# Patient Record
Sex: FEMALE | Race: WHITE | NOT HISPANIC OR LATINO | Employment: UNEMPLOYED | URBAN - METROPOLITAN AREA
[De-identification: names, ages, dates, MRNs, and addresses within clinical notes are randomized per-mention and may not be internally consistent; named-entity substitution may affect disease eponyms.]

---

## 2018-05-09 ENCOUNTER — APPOINTMENT (OUTPATIENT)
Dept: PHYSICAL MEDICINE AND REHAB | Facility: MEDICAL CENTER | Age: 58
End: 2018-05-09
Payer: MEDICAID

## 2018-05-11 ENCOUNTER — HOSPITAL ENCOUNTER (OUTPATIENT)
Dept: RADIOLOGY | Facility: MEDICAL CENTER | Age: 58
End: 2018-05-11

## 2018-05-11 ENCOUNTER — OFFICE VISIT (OUTPATIENT)
Dept: PHYSICAL MEDICINE AND REHAB | Facility: MEDICAL CENTER | Age: 58
End: 2018-05-11
Payer: MEDICAID

## 2018-05-11 VITALS
WEIGHT: 191 LBS | DIASTOLIC BLOOD PRESSURE: 70 MMHG | SYSTOLIC BLOOD PRESSURE: 122 MMHG | HEART RATE: 83 BPM | BODY MASS INDEX: 31.82 KG/M2 | OXYGEN SATURATION: 95 % | HEIGHT: 65 IN | TEMPERATURE: 97.8 F

## 2018-05-11 DIAGNOSIS — M48.02 FORAMINAL STENOSIS OF CERVICAL REGION: ICD-10-CM

## 2018-05-11 DIAGNOSIS — M47.816 LUMBAR SPONDYLOSIS: ICD-10-CM

## 2018-05-11 DIAGNOSIS — M48.02 CERVICAL STENOSIS OF SPINAL CANAL: ICD-10-CM

## 2018-05-11 DIAGNOSIS — M43.12 SPONDYLOLISTHESIS, CERVICAL REGION: ICD-10-CM

## 2018-05-11 DIAGNOSIS — R20.2 PARESTHESIAS: ICD-10-CM

## 2018-05-11 PROCEDURE — 99205 OFFICE O/P NEW HI 60 MIN: CPT | Performed by: PHYSICAL MEDICINE & REHABILITATION

## 2018-05-11 RX ORDER — DIPHENHYDRAMINE HCL 25 MG
25 TABLET ORAL EVERY 6 HOURS PRN
COMMUNITY
End: 2018-08-22

## 2018-05-11 RX ORDER — FLUOXETINE HYDROCHLORIDE 40 MG/1
40 CAPSULE ORAL DAILY
COMMUNITY

## 2018-05-11 RX ORDER — CLONAZEPAM 1 MG/1
0.5 TABLET ORAL 3 TIMES DAILY
COMMUNITY
End: 2018-07-27

## 2018-05-11 RX ORDER — METHOCARBAMOL 500 MG/1
1000 TABLET, FILM COATED ORAL 4 TIMES DAILY
COMMUNITY

## 2018-05-11 RX ORDER — TRAZODONE HYDROCHLORIDE 50 MG/1
50 TABLET ORAL 3 TIMES DAILY
COMMUNITY

## 2018-05-11 RX ORDER — LISINOPRIL 10 MG/1
10 TABLET ORAL DAILY
COMMUNITY
End: 2018-07-27

## 2018-05-11 RX ORDER — IPRATROPIUM BROMIDE AND ALBUTEROL SULFATE 2.5; .5 MG/3ML; MG/3ML
3 SOLUTION RESPIRATORY (INHALATION) 4 TIMES DAILY
COMMUNITY

## 2018-05-11 RX ORDER — IBUPROFEN 600 MG/1
600 TABLET ORAL EVERY 6 HOURS PRN
COMMUNITY

## 2018-05-11 RX ORDER — OLANZAPINE 5 MG/1
5 TABLET ORAL NIGHTLY
COMMUNITY
End: 2018-08-22

## 2018-05-11 RX ORDER — GABAPENTIN 300 MG/1
300 CAPSULE ORAL 2 TIMES DAILY
COMMUNITY
End: 2018-07-03 | Stop reason: SDUPTHER

## 2018-05-11 RX ORDER — MONTELUKAST SODIUM 10 MG/1
10 TABLET ORAL DAILY
COMMUNITY

## 2018-05-11 RX ORDER — SIMVASTATIN 40 MG
40 TABLET ORAL NIGHTLY
COMMUNITY

## 2018-05-11 RX ORDER — FLUTICASONE PROPIONATE 50 MCG
1 SPRAY, SUSPENSION (ML) NASAL DAILY
COMMUNITY

## 2018-05-11 ASSESSMENT — ENCOUNTER SYMPTOMS
BLURRED VISION: 1
SHORTNESS OF BREATH: 1
WEIGHT LOSS: 1
CHILLS: 1
BACK PAIN: 1
SINUS PAIN: 1
DEPRESSION: 1
NECK PAIN: 1
HALLUCINATIONS: 1
CARDIOVASCULAR NEGATIVE: 1
ROS GI COMMENTS: ULCERATIVE COLITIS
DIZZINESS: 1

## 2018-05-11 ASSESSMENT — PAIN SCALES - GENERAL: PAINLEVEL: 8=MODERATE-SEVERE PAIN

## 2018-05-11 NOTE — PROGRESS NOTES
New patient note    Physiatry (physical medicine and  Rehabilitation), interventional spine and sports medicine, Pain medicine    Date of Service: 5/11/2018    Chief complaint:   Multiple joint pains    HISTORY    HPI: Dang Urbina 57 y.o. female who presents today with complaints of multiple pain locations.  The back pain seems to be the worst to her.  The pain has been present her whole life.  At age 28, she was started on tylenol #3 and has been on pain medications on and off since that time.  In the past, she was started on methadone, but felt that this was not for her.  No opioids since 2015.   She takes ibuprofen for pain, but not regularly.    She has numbness in the left hand and feels like the whole hand from the wrist down.  She has started using her right hand for more things because of the weakness and decreased dexterity in the left hand.  Occasionally, it wakes her up at night.    Her low back pain is primarily aching pain.  No numbness or tingling in the legs.  She is scheduled to do physical therapy next Tuesday and then hopefully, her insurance will approve this.  Walking helps with her back, she has been walking up to a mile a day.  Overall, she has been less active and has gained about 20 lbs. In the last three months.     ORT 16   reviewed.  Patient takes klonipin 1mg po tid  Patient uses cannabis about three times a month    Medical records review:  I reviewed the note from the referring provider Riri Hamm P.A. Dated 04/15/2018.  Additionally, reviewing note from 03/13/2018: She was seen in [place] by Norma Gallego on 05/12/2016, but then moved to [place]. She was apparently getting norco 10/325 #120 and soma 350mg #90 while in [place].  From review of the note on 03/13/2018, the patient wanted to restart klonipin rather than restrat pain medications.  She has been seeing behavrioral health through teleedicine at that time (she is now transitioning to have appointments at the  clinic).  From the report there, she drinks alcohol about 4-6 times a month and uses cannibis to control pain.  MRIs were ordered of the cervical and lumbar spine as well as the left shoulder at that time.  They increased her gabapentin dose and were using nicotine patches to see if she could quit smoking.    Previous treatments:    Physical Therapy: Yes     Medications the patient is tried: NSAIDs      ROS:   Red Flags ROS:   Fever, Chills, Sweats: Denies  Involuntary Weight Loss: Denies  Bladder Incontinence: Denies, sometimes cannot tell when she has to go, for last 4 years  Bowel Incontinence: Denies, sometimes cannot tell when she has to go, for last 4 years  Saddle Anesthesia: Reports that this is intermittent    Review of Systems   Constitutional: Positive for chills and weight loss.        Wt loss age 20   HENT: Positive for sinus pain.    Eyes: Positive for blurred vision.   Respiratory: Positive for shortness of breath.    Cardiovascular: Negative.    Gastrointestinal:        Ulcerative colitis   Genitourinary: Positive for urgency.   Musculoskeletal: Positive for back pain, joint pain and neck pain.   Skin: Positive for rash.   Neurological: Positive for dizziness.   Endo/Heme/Allergies: Negative.    Psychiatric/Behavioral: Positive for depression and hallucinations.       PMHx:  Past Medical History:   Diagnosis Date   • Allergy    • Anxiety    • Arthritis    • Asthma    • COPD (chronic obstructive pulmonary disease) ([facility])    • Depression    • Diabetes ([facility])    • Hypertension    • Migraine        PSHx:    Past Surgical History:   Procedure Laterality Date   • TUBAL COAGULATION LAPAROSCOPIC BILATERAL         Family history   Family History   Problem Relation Age of Onset   • Lung Disease Mother    • Hypertension Mother    • Arthritis Mother        Medications:    Current Outpatient Prescriptions   Medication   • methocarbamol (ROBAXIN) 500 MG Tab   • gabapentin (NEURONTIN) 300 MG Cap   • OLANZapine  "(ZYPREXA) 5 MG Tab   • montelukast (SINGULAIR) 10 MG Tab   • clonazePAM (KLONOPIN) 1 MG Tab   • simvastatin (ZOCOR) 40 MG Tab   • lisinopril (PRINIVIL) 10 MG Tab   • traZODone (DESYREL) 50 MG Tab   • metFORMIN (GLUCOPHAGE) 500 MG Tab   • fluoxetine (PROZAC) 40 MG capsule   • fluticasone (FLONASE) 50 MCG/ACT nasal spray   • fluticasone-salmeterol (ADVAIR DISKUS) 100-50 MCG/DOSE AEROSOL POWDER, BREATH ACTIVATED   • ipratropium-albuterol (DUONEB) 0.5-2.5 (3) MG/3ML nebulizer solution   • ibuprofen (MOTRIN) 600 MG Tab   • Albuterol Sulfate (PROVENTIL HFA INH)   • diphenhydrAMINE (BENADRYL) 25 MG Tab     No current facility-administered medications for this visit.        Allergies:   Allergies   Allergen Reactions   • Tramadol Unspecified     Patient reports that this caused falls       Social Hx:   Social History     Social History   • Marital status: Single     Spouse name: N/A   • Number of children: N/A   • Years of education: N/A     Occupational History   • disabled      2000     Social History Main Topics   • Smoking status: Current Every Day Smoker     Packs/day: 0.50     Years: 40.00     Types: Cigarettes   • Smokeless tobacco: Never Used   • Alcohol use No   • Drug use: Yes     Frequency: 2.0 times per week     Types: Marijuana      Comment: 2 A MONTH   • Sexual activity: Not on file     Other Topics Concern   •  Service No   • Blood Transfusions No   • Caffeine Concern No   • Occupational Exposure No   • Hobby Hazards No   • Sleep Concern No   • Stress Concern Yes   • Weight Concern Yes   • Special Diet No   • Back Care No   • Exercise Yes   • Bike Helmet No   • Seat Belt Yes   • Self-Exams Yes     Social History Narrative   • No narrative on file         EXAMINATION     Physical Exam:   Vitals: Blood pressure 122/70, pulse 83, temperature 36.6 °C (97.8 °F), height 1.651 m (5' 5\"), weight 86.6 kg (191 lb), SpO2 95 %.    Constitutional:   Body Habitus: Body mass index is 31.78 kg/m².  Cooperation: Fully " cooperates with exam  Appearance: Well-groomed, well-nourished, not disheveled   Eyes: No scleral icterus, no proptosis  ENT -no obvious auditory deficits, no obvious tongue lesions, tongue midline, no facial droop   Skin -no rashes or lesions noted   Respiratory-  breathing comfortable on room air, no audible wheezing  Cardiovascular- capillary refills less than 2 seconds. No lower extremity edema is noted.   Gastrointestinal - no obvious abdominal masses, No tenderness to palpation in the abdomen  Psychiatric- alert and oriented ×3. Normal affect.   Gait - normal gait, no use of ambulatory device, nonantalgic.  Heel and toe walking is difficult    Musculoskeletal -   Cervical spine   Inspection: No deformities of the skin over the cervical spine. No rashes or lesions.  decreased A/P ROM in all directions, with  pain    Spurling’s sign: positive bilaterally for neck pain  No signs of muscular atrophy in bilateral upper extremities       Thoracic/Lumbar Spine/Sacral Spine/Hips   Inspection: No evidence of atrophy in bilateral lower extremities throughout   ROM: full  AROM with flexion, extension, lateral flexion, and rotation bilaterally, without pain     Palpation:   No tenderness to palpation in midline at T1-T12 levels. No tenderness to palpation in the left and right of the midline T1-L5, NEGATIVE for tenderness to palpation to the para-midline region in the lower lumbar levels.  palpation over SI joint: negative bilaterally      Lumbar spine Special tests  Neuro tension  Straight leg test negative bilaterally      HIP  ROM of the hips is symmetric and nonpainful in internal and externl rotation bilaterally    SI joint tests  Observation patient sits on one buttocks: Negative   ALFREDO test negative bilaterally     Neuro     Key points for the international standards for neurological classification of spinal cord injury (ISNCSCI) to light touch.     Dermatome R L   C4 2  2   C5 2 2   C6 2 2   C7 2 2   C8 2 2   T1  2 2   T2 2 2   L2 2 2   L3 2 2   L4 2 2   L5 2 2   S1 2 2   S2 2 2       Motor Exam Upper Extremities   ? Myotome R L   Shoulder flexion C5 5 4+   Elbow flexion C5 5 4+   Wrist extension C6 5 5-   Elbow extension C7 5 5-   Finger flexion C8 5 5   Finger abduction T1 5 5     Empty can test is positive on the left and negative on the right.  Positive Hawkin's test on the left and negative on the right.  Decreased AROM of the left shoulder.      Motor Exam Lower Extremities    ? Myotome R L   Hip flexion L2 5 5   Knee extension L3 5 5   Ankle dorsiflexion L4 5 5   Toe extension L5 5 5   Ankle plantarflexion S1 5 5       Roblero’s sign positive left, negative right   Babinski sign negative bilaterally   Clonus of the ankle: one to two beats on the left ankle, no clonus on the right    Reflexes  ?  R L   Biceps  2+  2+   Brachioradialis  2+ 2+   Patella  2+ 2+   Achilles   2+ 2+       MEDICAL DECISION MAKING    Medical records review: see under HPI section.     DATA    Labs:   No results found for: SODIUM, POTASSIUM, CHLORIDE, CO2, GLUCOSE, BUN, CREATININE, BUNCREATRAT, GLOMRATE     No results found for: PROTHROMBTM, INR     No results found for: WBC, RBC, HEMOGLOBIN, HEMATOCRIT, MCV, MCH, MCHC, MPV, NEUTSPOLYS, LYMPHOCYTES, MONOCYTES, EOSINOPHILS, BASOPHILS, HYPOCHROMIA, ANISOCYTOSIS       Imaging: I personally reviewed following images, these are my reads  Cervical spine MRI 04/19/2018: There are multiple levels of canal stenosis due to disc-osteophyte complexes, facet arthropathy and congenitally small canal.  C4-5: Effacement of the CSF around the cord with some cord flattening.  Foraminal stenosis is noted.  C5-6 effacement of the CSF around the cord.  Severe foraminal narrowing is noted at this level.  C6-7 bilateral foraminal stenosis.    IMAGING radiology reads. I reviewed the following radiology reads   Cervical spine: 04/19/2018: Impression: moderate multilevel degenerative cervical spondylosis.  This results  in mild-to-moderate multilevel acquired/congenital canal stenosis most severe at C4-5 through C6-7.  There is multilevel accompanying foraminal narrowing as outlined above.    Xrays cervical spine: 03/05/2018: Grade I anterolisthesis of C4 on C5 is noted of 5 mm.  Mild multilevel cervical spondylosis    MRI lumbar spine 04/19/2018: Impression:  Large diffuse disc bulge with annular fissure and moderate facet and ligamentum flavum hypertrophy at L4-5 combine to cause mild to moderate narrowing of the central canal to 8-9 mm AP and impingement of the lateral recesses.  Mild bilateral foraminal narrowing as well.    2. Mild diffuse disc bulging, facet and ligamentum flavum hypertrophy at L3-4 and L5-S1 without central stenosis.  Mild impingement of the lateral recesses at L3-4 and mild foraminal narrowing at both levels.             Left shoulder: 04/19/2018:Impression: Small full-thickness tear posterior supraspinatus footplate measuring 1cm in the AP with minimal medial retraction of the torn tendon fibers.  No muscle atrophy.,  2. Small glenohumeral joint effusion                                                                                               Diagnosis   Diagnoses of Cervical stenosis of spinal canal, Spondylolisthesis, cervical region, Foraminal stenosis of cervical region, Lumbar spondylosis, and Paresthesias were pertinent to this visit.      ASSESSMENT:  Dang Shaikhwindy 57 y.o. female who has      Dang was seen today for new patient.    Diagnoses and all orders for this visit:    Cervical stenosis of spinal canal  -     REFERRAL TO EMG - PHYSIATRY (PMR)    Spondylolisthesis, cervical region  -     DX-CERVICAL SPINE-FLX-EXT ONLY; Future  -     REFERRAL TO EMG - PHYSIATRY (PMR)    Foraminal stenosis of cervical region  -     REFERRAL TO EMG - PHYSIATRY (PMR)    Lumbar spondylosis    Paresthesias  -     REFERRAL TO EMG - PHYSIATRY (PMR)    Dang was referred for musculoskeletal pain complaints.  I have  requested cervical spine films to assess for instability in flexion/extension given the report of 5mm of C4-5 anterolisthesis.  This is not so apparent on MRI cervical spine, but should be assessed.      While she has a partial full-thickness rotator cuff tear, I am concerned that some of her left upper extremity symptoms could relate to the spinal and foraminal stenosis that she has in her cervical spine.  I have ordered an EMG.    Once the EMG and cervical spine films are reviewed, we can determine best treatment.  If she is unstable, clearly Neurosurgical referral is indicated, particularly considering the cord flattening already noted.  It may be that surgical referral is determined anyway, we can decide this after the imaging.    If no instability, a trial of injections and/or physical therapy could also be considered with surgical consultation still considered.    Regarding her use of pain medications.  Just a few months ago, she felt that being back on the klonipin was most helpful for her.  Now, she is feeling like the pain medications are more important.  We discussed that at this clinic, I will not write opioids if she is using benzodiazepines or marijuana.  These are both things that she uses and I would like her to consult with behavioral health.  Additionally, she is a high risk for use of opioids with an ORT of 16. Use of opioids would not be recommended at this time.    Thank you very much for asking me to participate in Dang Urbina's care.  Please contact me with any questions or concerns.    Follow-up:for EMG or after imaging      Please note that this dictation was created using voice recognition software. I have made every reasonable attempt to correct obvious errors but there may be errors of grammar and content that I may have overlooked prior to finalization of this note.      Christopher Olson MD  Physical Medicine and Rehabilitation  Interventional Spine and Sports Physiatry  [facility]        Riri Chase P.A.

## 2018-05-15 ENCOUNTER — TELEPHONE (OUTPATIENT)
Dept: PHYSICAL MEDICINE AND REHAB | Facility: MEDICAL CENTER | Age: 58
End: 2018-05-15

## 2018-05-15 NOTE — TELEPHONE ENCOUNTER
I spoke with Dang and let her know she has c spine Xray to do, that may have been put in after last visit.     She said she will have done. She wants me to fax order to [facility].

## 2018-05-15 NOTE — TELEPHONE ENCOUNTER
I left message to call me back. ( wanted to let her know she has an Xray order in chart to have done.)

## 2018-05-17 ENCOUNTER — HOSPITAL ENCOUNTER (OUTPATIENT)
Dept: RADIOLOGY | Facility: MEDICAL CENTER | Age: 58
End: 2018-05-17

## 2018-06-18 ENCOUNTER — OFFICE VISIT (OUTPATIENT)
Dept: BEHAVIORAL HEALTH | Facility: PHYSICIAN GROUP | Age: 58
End: 2018-06-18
Payer: MEDICAID

## 2018-06-18 VITALS
SYSTOLIC BLOOD PRESSURE: 126 MMHG | RESPIRATION RATE: 16 BRPM | DIASTOLIC BLOOD PRESSURE: 74 MMHG | BODY MASS INDEX: 31.99 KG/M2 | WEIGHT: 192 LBS | HEART RATE: 86 BPM | TEMPERATURE: 97.9 F | HEIGHT: 65 IN

## 2018-06-18 DIAGNOSIS — F25.1 SCHIZOAFFECTIVE DISORDER, DEPRESSIVE TYPE (HCC): ICD-10-CM

## 2018-06-18 PROCEDURE — 90833 PSYTX W PT W E/M 30 MIN: CPT | Performed by: PSYCHIATRY & NEUROLOGY

## 2018-06-18 PROCEDURE — 99213 OFFICE O/P EST LOW 20 MIN: CPT | Performed by: PSYCHIATRY & NEUROLOGY

## 2018-06-18 RX ORDER — ARIPIPRAZOLE 10 MG/1
10 TABLET ORAL DAILY
Qty: 30 TAB | Refills: 2 | Status: SHIPPED | OUTPATIENT
Start: 2018-06-18 | End: 2018-07-27

## 2018-06-18 RX ORDER — OLANZAPINE 10 MG/1
10 TABLET ORAL DAILY
Qty: 7 TAB | Refills: 0 | Status: SHIPPED | OUTPATIENT
Start: 2018-06-18 | End: 2018-08-22

## 2018-06-18 RX ORDER — OLANZAPINE 5 MG/1
5 TABLET ORAL EVERY EVENING
Qty: 7 TAB | Refills: 0 | Status: SHIPPED | OUTPATIENT
Start: 2018-06-18

## 2018-06-18 ASSESSMENT — PATIENT HEALTH QUESTIONNAIRE - PHQ9
2. FEELING DOWN, DEPRESSED, IRRITABLE, OR HOPELESS: 0
1. LITTLE INTEREST OR PLEASURE IN DOING THINGS: 0
SUM OF ALL RESPONSES TO PHQ9 QUESTIONS 1 AND 2: 0

## 2018-06-18 NOTE — PROGRESS NOTES
"[facility]  PSYCHIATRIC FOLLOW-UP NOTE    Name: Dang Urbina  MRN: [id number]  : 1960  Age: 57 y.o.  Date of assessment: 2018  PCP: STACEY Wilson  Persons in attendance: Patient  REASON FOR VISIT/CHIEF COMPLAINT (as stated by Patient):  Dang Urbina is a 57 y.o., White female, attending follow-up appointment for   Chief Complaint   Patient presents with   • Medication Management     I am getting off from clonazepam because I need my pain medications. I want to get off from zyprexa.   .      HISTORY OF PRESENT ILLNESS:    This is 56 yo female, disabled, lives in [place], seen today for follow up after 22 months. The patient had been going to Dr. Bernal and she came to [facility] in . The patient has been on zyprexa 20 mg , fluoxetine 40 mg and clonazepam 1 mg at bed time. The patient stopped clonazepam because she is on pain medication. The patient would like to get of from zyprexa and she wants to try some thing different. The patient has been taking trazodone for anxiety and sleep.      PSYCHOSOCIAL CHANGES SINCE PREVIOUS CONTACT:  The patient denied depression and suicidal thoughts.    RESPONSE TO TREATMENT:  Zyprexa 20 mg one a day, fluoxetine 40 mg one a day, and trazodone for sleep. Stopped clonazepam.    MEDICATION SIDE EFFECTS:  Weight gain.    REVIEW OF SYSTEMS:        Constitutional negative   Eyes negative   Ears/Nose/Mouth/Throat negative   Cardiovascular positive - hypertension   Respiratory positive - chronic obstructive pulmonary    Gastrointestinal negative   Genitourinary negative   Muscular negative   Integumentary positive - degenerative disc disease.   Neurological positive - migraine   Endocrine positive - diabetes, hyperlipidemia.   Hematologic/Lymphatic negative       PSYCHIATRIC EXAMINATION/MENTAL STATUS  /74   Pulse 86   Temp 36.6 °C (97.9 °F)   Resp 16   Ht 1.651 m (5' 5\")   Wt 87.1 kg (192 lb)   BMI 31.95 kg/m²   Participation: Active " verbal participation and Attentive  Grooming:Neat  Orientation: Alert and Fully Oriented  Eye contact: Good  Behavior:calm   Mood: Anxious  Affect: Anxious  Thought process: Logical and Goal-directed  Thought content:  Within normal limits  Speech: Rate within normal limits and Volume within normal limits  Perception:  Within normal limits  Memory:  No gross evidence of memory deficits  Insight: Good  Judgment: Good  Family/couple interaction observations:   Other:    Current risk:    Suicide: Low   Homicide: Low   Self-harm: Low  Relapse: Low  Other:   Crisis Safety Plan reviewed?No  If evidence of imminent risk is present, intervention/plan:    Medical Records/Labs/Diagnostic Tests Reviewed: yes.    Medical Records/Labs/Diagnostic Tests Ordered: none.    DIAGNOSTIC IMPRESSION(S):  1. Schizoaffective disorder, depressive type (HCC)           ASSESSMENT AND PLAN:    1. Schizoaffective disorder  Decrease zyprexa 10 mg for one week # 7 NR  Then zyprexa 5 mg one for one week # 7 NR Then stop.  Start abilify 10 mg one a day # 30 refills two  And may increase to abilify 15 mg one day in one month.  Stopped clonazepam  Continue trazodone for anxiety and insomnia.  Continue fluoxetine 40 mg one a day.    2, call in one month  And follow up in [facility].      16 minutes were spent in psychotherapy.  (If greater than 16 minutes, add [id number] code)   Topics addressed in psychotherapy include:  We discussed her unresolved emotional issued and helped with emotional skills.  Improved her self esteem.  Agreed to keep a daily routine and a good sleep cycle.  Farhat Yadav M.D.

## 2018-07-03 ENCOUNTER — OFFICE VISIT (OUTPATIENT)
Dept: PHYSICAL MEDICINE AND REHAB | Facility: MEDICAL CENTER | Age: 58
End: 2018-07-03
Payer: MEDICAID

## 2018-07-03 VITALS
HEIGHT: 65 IN | BODY MASS INDEX: 32.49 KG/M2 | SYSTOLIC BLOOD PRESSURE: 126 MMHG | OXYGEN SATURATION: 97 % | HEART RATE: 80 BPM | WEIGHT: 195 LBS | DIASTOLIC BLOOD PRESSURE: 70 MMHG | TEMPERATURE: 97.8 F

## 2018-07-03 DIAGNOSIS — M48.02 FORAMINAL STENOSIS OF CERVICAL REGION: ICD-10-CM

## 2018-07-03 DIAGNOSIS — M43.12 SPONDYLOLISTHESIS, CERVICAL REGION: ICD-10-CM

## 2018-07-03 DIAGNOSIS — M48.02 CERVICAL STENOSIS OF SPINAL CANAL: ICD-10-CM

## 2018-07-03 DIAGNOSIS — E11.9 TYPE 2 DIABETES MELLITUS WITHOUT COMPLICATION, WITHOUT LONG-TERM CURRENT USE OF INSULIN (HCC): ICD-10-CM

## 2018-07-03 PROCEDURE — 99214 OFFICE O/P EST MOD 30 MIN: CPT | Performed by: PHYSICAL MEDICINE & REHABILITATION

## 2018-07-03 RX ORDER — GABAPENTIN 300 MG/1
600 CAPSULE ORAL 3 TIMES DAILY
Qty: 180 CAP | Refills: 0 | Status: SHIPPED | OUTPATIENT
Start: 2018-07-03 | End: 2018-07-03 | Stop reason: SDUPTHER

## 2018-07-03 RX ORDER — GABAPENTIN 300 MG/1
600 CAPSULE ORAL 3 TIMES DAILY
Qty: 180 CAP | Refills: 0 | Status: SHIPPED | OUTPATIENT
Start: 2018-07-03

## 2018-07-03 ASSESSMENT — PAIN SCALES - GENERAL: PAINLEVEL: 10=SEVERE PAIN

## 2018-07-03 NOTE — PROGRESS NOTES
Follow up patient note  Pain Medicine, Interventional spine and sports physiatry, Physical medicine rehabilitation      Chief complaint:   Neck and left shoulder pain      HISTORY    Please see new patient note by Dr. Olson,  for more details.     HPI  Patient identification: Dang Urbina 57 y.o. female who presents for follow-up of her chronic neck pain    Interval history: Dang reports that she has had an injection in her left shoulder that lasted about a week and was told that she needs to have surgery for that.  A recent injection only lasted about a week. However, she was also told by that surgeon that she should pursue surgical management of her cervical spine.    Also, she has apparently been seen in Dr. Tinsley's office, who she is told also wants to do surgery.  She is not sure in what area.  It turns out that she saw them about three weeks prior to our first visit on 05/11/2018.    Sometime in March, physical therapy was ordered and she is getting this started now with plan to follow-up with Dr. Tinsley.    She has been trying to stop her clonazepam and is now taking it a few times a week.  Apparently, she stopped using marijuana about 2 weeks ago.       ROS Red Flags :   Fever, Chills, Sweats: Denies  Involuntary Weight Loss: Denies  Bowel/Bladder Incontinence: Denies  Saddle Anesthesia: Denies      PMHx:  Past Medical History:   Diagnosis Date   • Allergy    • Anxiety    • Arthritis    • Asthma    • COPD (chronic obstructive pulmonary disease) ([facility])    • Depression    • Diabetes ([facility])    • Hypertension    • Migraine    • Schizophrenia ([facility])        PSHx:   Past Surgical History:   Procedure Laterality Date   • TUBAL COAGULATION LAPAROSCOPIC BILATERAL         Family history   Denies neuromuscular disease  Family History   Problem Relation Age of Onset   • Lung Disease Mother    • Hypertension Mother    • Arthritis Mother        Medications:   Current Outpatient Prescriptions   Medication   • gabapentin  (NEURONTIN) 300 MG Cap   • olanzapine (ZYPREXA) 10 MG tablet   • OLANZapine (ZYPREXA) 5 MG Tab   • ARIPiprazole (ABILIFY) 10 MG Tab   • methocarbamol (ROBAXIN) 500 MG Tab   • OLANZapine (ZYPREXA) 5 MG Tab   • montelukast (SINGULAIR) 10 MG Tab   • clonazePAM (KLONOPIN) 1 MG Tab   • simvastatin (ZOCOR) 40 MG Tab   • lisinopril (PRINIVIL) 10 MG Tab   • traZODone (DESYREL) 50 MG Tab   • metFORMIN (GLUCOPHAGE) 500 MG Tab   • fluoxetine (PROZAC) 40 MG capsule   • fluticasone (FLONASE) 50 MCG/ACT nasal spray   • fluticasone-salmeterol (ADVAIR DISKUS) 100-50 MCG/DOSE AEROSOL POWDER, BREATH ACTIVATED   • ipratropium-albuterol (DUONEB) 0.5-2.5 (3) MG/3ML nebulizer solution   • ibuprofen (MOTRIN) 600 MG Tab   • diphenhydrAMINE (BENADRYL) 25 MG Tab   • Albuterol Sulfate (PROVENTIL HFA INH)     No current facility-administered medications for this visit.        Allergies:   Allergies   Allergen Reactions   • Tramadol Unspecified     Patient reports that this caused falls       Social Hx:   Social History     Social History   • Marital status: Single     Spouse name: N/A   • Number of children: N/A   • Years of education: N/A     Occupational History   • disabled      2000     Social History Main Topics   • Smoking status: Current Every Day Smoker     Packs/day: 0.50     Years: 40.00     Types: Cigarettes   • Smokeless tobacco: Never Used   • Alcohol use No   • Drug use: Yes     Frequency: 2.0 times per week     Types: Marijuana      Comment: 2 A MONTH   • Sexual activity: Not on file     Other Topics Concern   •  Service No   • Blood Transfusions No   • Caffeine Concern No   • Occupational Exposure No   • Hobby Hazards No   • Sleep Concern No   • Stress Concern Yes   • Weight Concern Yes   • Special Diet No   • Back Care No   • Exercise Yes   • Bike Helmet No   • Seat Belt Yes   • Self-Exams Yes     Social History Narrative   • No narrative on file         EXAMINATION     Physical Exam:   Vitals: Blood pressure 126/70,  "pulse 80, temperature 36.6 °C (97.8 °F), height 1.651 m (5' 5\"), weight 88.5 kg (195 lb), SpO2 97 %.    Constitutional:   Body Habitus: Body mass index is 32.45 kg/m².  Cooperation: Fully cooperates with exam  Appearance: Well-groomed no disheveled, in no acute distress    Respiratory-  breathing comfortable on room air, no audible wheezing  Cardiovascular- capillary refills less than 2 seconds. No lower extremity edema is noted.   Psychiatric- alert and oriented ×3. Normal affect.   Spine:  Decreased ROM of the cervical spine.  Neck pain with Spurling's test bilaterally, no significant referred pain.  Sensation is intact to light touch in the upper extremities bilaterally.  Reflexes are 2+ biceps, triceps, patella and achilles bilaterally.  Roblero's is negative bilaterally.  Manual muscle testing reveals no focal motor deficits in the right upper extremity and on the left 4/5 shoulder abduction, 4/5 elbow flexion(patient winced with pain in the shoulder), 5/5 wrist extension, 5/5 elbow extension, 5/5 , and interossei.  Empty can positive on the left, Hawkin's positive on the left.      MEDICAL DECISION MAKING    DATA    Labs: No new labs to review      Imaging: I personally reviewed following images    MRI cervical spine 05/15/2018:   There is multilevel cervical stenosis with effacement of the CSF and mild cord compression, C4-5, C5-6 with multilevel foraminal stenosis.                   DIAGNOSIS   Visit Diagnoses     ICD-10-CM   1. Cervical stenosis of spinal canal M48.02   2. Spondylolisthesis, cervical region M43.12   3. Foraminal stenosis of cervical region M99.81   4. Type 2 diabetes mellitus without complication, without long-term current use of insulin ([facility]) E11.9         ASSESSMENT and PLAN:     Dang Urbina 57 y.o. female with multilevel cervical spinal stenosis and foraminal stenosis.    Dang was seen today for follow-up.    Diagnoses and all orders for this visit:    Cervical stenosis of spinal " canal  -     PAIN MANAGEMENT SCRN, UR; Future  -     Discontinue: gabapentin (NEURONTIN) 300 MG Cap; Take 2 Caps by mouth 3 times a day.  -     gabapentin (NEURONTIN) 300 MG Cap; Take 2 Caps by mouth 3 times a day.    Spondylolisthesis, cervical region  -     PAIN MANAGEMENT SCRN, UR; Future  -     Discontinue: gabapentin (NEURONTIN) 300 MG Cap; Take 2 Caps by mouth 3 times a day.  -     gabapentin (NEURONTIN) 300 MG Cap; Take 2 Caps by mouth 3 times a day.    Foraminal stenosis of cervical region    Type 2 diabetes mellitus without complication, without long-term current use of insulin ([facility])  -     HEMOGLOBIN A1C; Future    Discussed that chronic opioids are not safe to use with other medications that she has been taking. She states that she has stopped taking the benzodiazepines and cannot afford marijuana.  I will check UDS today, although we have discussed that opioids are not likely to be the lillie way to manage her condition.  She is also a high risk for opioid use based on the ORT of 16    Given the severity of the cervical spinal stenosis, I have recommended that she follow-up with Dr. Tinsley's plan.  She is going to start PT and then pursue surgical management.  I suspect that injections would not adequately manage her symptoms.  Then, surgical management of the left shoulder.  Doing this, we can hope that her pain and symptoms will improve.    She is disappointed by this, but is agreeable. Hopefully, she will be compliant with the treatment plan.      Follow up: 1 month, prn    Thank you for allowing me to participate in the care of this patient. If you have any questions please not hesitate to contact me.      Please note that this dictation was created using voice recognition software. I have made every reasonable attempt to correct obvious errors but there may be errors of grammar and content that I may have overlooked prior to finalization of this note.      Christopher Olson MD  Interventional Spine and  Sports Physiatry  Physical Medicine and Rehabilitation  [facility]  7/3/2018 12:06 PM

## 2018-07-05 ENCOUNTER — TELEPHONE (OUTPATIENT)
Dept: PHYSICAL MEDICINE AND REHAB | Facility: MEDICAL CENTER | Age: 58
End: 2018-07-05

## 2018-07-05 NOTE — TELEPHONE ENCOUNTER
Patient called to let you know, she have a lot of pain and the gabapentin is not working, she states needs pain medication asap.

## 2018-07-16 RX ORDER — ARIPIPRAZOLE 15 MG/1
15 TABLET ORAL DAILY
Qty: 30 TAB | Refills: 2 | Status: SHIPPED | OUTPATIENT
Start: 2018-07-16 | End: 2018-08-22

## 2018-07-27 ENCOUNTER — OFFICE VISIT (OUTPATIENT)
Dept: PHYSICAL MEDICINE AND REHAB | Facility: MEDICAL CENTER | Age: 58
End: 2018-07-27
Payer: MEDICAID

## 2018-07-27 VITALS
BODY MASS INDEX: 31.96 KG/M2 | OXYGEN SATURATION: 94 % | TEMPERATURE: 97.4 F | DIASTOLIC BLOOD PRESSURE: 98 MMHG | HEART RATE: 67 BPM | SYSTOLIC BLOOD PRESSURE: 130 MMHG | WEIGHT: 191.8 LBS | HEIGHT: 65 IN

## 2018-07-27 DIAGNOSIS — M43.12 SPONDYLOLISTHESIS, CERVICAL REGION: ICD-10-CM

## 2018-07-27 DIAGNOSIS — M48.02 FORAMINAL STENOSIS OF CERVICAL REGION: ICD-10-CM

## 2018-07-27 DIAGNOSIS — M48.02 CERVICAL STENOSIS OF SPINAL CANAL: ICD-10-CM

## 2018-07-27 PROCEDURE — 99214 OFFICE O/P EST MOD 30 MIN: CPT | Performed by: PHYSICAL MEDICINE & REHABILITATION

## 2018-07-27 RX ORDER — TRIAMCINOLONE ACETONIDE 1 MG/G
OINTMENT TOPICAL
Refills: 1 | COMMUNITY
Start: 2018-07-23

## 2018-07-27 RX ORDER — UNDERPADS 23" X 36"
EACH MISCELLANEOUS
Refills: 0 | COMMUNITY
Start: 2018-07-18

## 2018-07-27 RX ORDER — CLOTRIMAZOLE 1 %
CREAM (GRAM) TOPICAL
Refills: 1 | COMMUNITY
Start: 2018-07-23

## 2018-07-27 RX ORDER — BUPRENORPHINE 5 UG/H
1 PATCH TRANSDERMAL
Qty: 4 PATCH | Refills: 0 | Status: SHIPPED | OUTPATIENT
Start: 2018-07-27 | End: 2018-08-22

## 2018-07-27 RX ORDER — ACETAMINOPHEN 500 MG/1
TABLET ORAL
Refills: 6 | COMMUNITY
Start: 2018-07-25

## 2018-07-27 RX ORDER — ALBUTEROL SULFATE 90 MCG
HFA AEROSOL WITH ADAPTER (GRAM) INHALATION
Refills: 5 | COMMUNITY
Start: 2018-05-02

## 2018-07-27 RX ORDER — MELATONIN/LEMON BALM LEAF EXTR 5MG-500MCG
TABLET ORAL
Refills: 0 | COMMUNITY
Start: 2018-07-11

## 2018-07-27 RX ORDER — FAMOTIDINE 20 MG/1
TABLET, FILM COATED ORAL
Refills: 0 | COMMUNITY
Start: 2018-07-10 | End: 2018-08-22

## 2018-07-27 ASSESSMENT — PAIN SCALES - GENERAL: PAINLEVEL: 8=MODERATE-SEVERE PAIN

## 2018-07-27 NOTE — PROGRESS NOTES
Follow up patient note  Pain Medicine, Interventional spine and sports physiatry, Physical medicine rehabilitation      Chief complaint:   Neck and left shoulder pain      HISTORY    Please see new patient note by Dr. Olson,  for more details.     HPI  Patient identification: Dang Urbina 57 y.o. female who presents for follow-up of her chronic neck pain    Interval history: Dang reports that she fell while feeding her chickens and landed on her left arm, shoulder and neck.  This was on Monday.  She went to the ER in [place].  They took xray of her neck, she says.  I now have a reports from xray and CT cervical spine at [facility] from 07/23/2018.    She is concerned about the left arm as it is numb and tingling below the forearm and into the hand.  Symptoms are less on the right arm since physical therapy was started.  This fall on Monday scared her, though, as the left hand became totally numb, this is less now after a few days.  She had a massage and they did not notice significant bruising, so she is encouraged about that.    Also, she has apparently been seen in Dr. Tinsley's office, who she is told also wants to do surgery.  She will see Dr. Tinsley after physical therapy.  This will be sometime after she completes physical therapy.  Today, she is anxious as she is very concerned about having the surgery.    Regarding her left shoulder, she has been told that she needs surgery in the past by Dr. Chiang, but that her neck is worse.       ROS Red Flags :   Fever, Chills, Sweats: Denies  Involuntary Weight Loss: Denies  Bowel/Bladder Incontinence: Denies  Saddle Anesthesia: Denies      PMHx:  Past Medical History:   Diagnosis Date   • Allergy    • Anxiety    • Arthritis    • Asthma    • COPD (chronic obstructive pulmonary disease) ([facility])    • Depression    • Diabetes ([facility])    • Hypertension    • Migraine    • Schizophrenia ([facility])        PSHx:   Past Surgical History:   Procedure Laterality Date   •  TUBAL COAGULATION LAPAROSCOPIC BILATERAL         Family history   Denies neuromuscular disease  Family History   Problem Relation Age of Onset   • Lung Disease Mother    • Hypertension Mother    • Arthritis Mother        Medications:   Current Outpatient Prescriptions   Medication   • QVAR 80 MCG/ACT inhaler   • clotrimazole (LOTRIMIN) 1 % Cream   • famotidine (PEPCID) 20 MG Tab   • ONE TOUCH ULTRA TEST strip   • Incontinence Supply Disposable (UNDERPADS) Misc   • NICOTINE STEP 3 7 MG/24HR PATCH 24 HR   • triamcinolone acetonide (KENALOG) 0.1 % Ointment   • PROVENTIL  (90 Base) MCG/ACT Aero Soln inhalation aerosol   • PAIN & FEVER EXTRA STRENGTH 500 MG Tab   • Buprenorphine 5 MCG/HR PATCH WEEKLY   • ARIPiprazole (ABILIFY) 15 MG Tab   • gabapentin (NEURONTIN) 300 MG Cap   • methocarbamol (ROBAXIN) 500 MG Tab   • montelukast (SINGULAIR) 10 MG Tab   • simvastatin (ZOCOR) 40 MG Tab   • traZODone (DESYREL) 50 MG Tab   • metFORMIN (GLUCOPHAGE) 500 MG Tab   • fluoxetine (PROZAC) 40 MG capsule   • fluticasone (FLONASE) 50 MCG/ACT nasal spray   • fluticasone-salmeterol (ADVAIR DISKUS) 100-50 MCG/DOSE AEROSOL POWDER, BREATH ACTIVATED   • ipratropium-albuterol (DUONEB) 0.5-2.5 (3) MG/3ML nebulizer solution   • ibuprofen (MOTRIN) 600 MG Tab   • Albuterol Sulfate (PROVENTIL HFA INH)   • olanzapine (ZYPREXA) 10 MG tablet   • OLANZapine (ZYPREXA) 5 MG Tab   • OLANZapine (ZYPREXA) 5 MG Tab   • diphenhydrAMINE (BENADRYL) 25 MG Tab     No current facility-administered medications for this visit.        Allergies:   Allergies   Allergen Reactions   • Tramadol Unspecified     Patient reports that this caused falls       Social Hx:   Social History     Social History   • Marital status: Single     Spouse name: N/A   • Number of children: N/A   • Years of education: N/A     Occupational History   • disabled      2000     Social History Main Topics   • Smoking status: Current Every Day Smoker     Packs/day: 0.50     Years: 40.00     " Types: Cigarettes   • Smokeless tobacco: Never Used   • Alcohol use No   • Drug use: Yes     Frequency: 2.0 times per week     Types: Marijuana      Comment: 2 A MONTH   • Sexual activity: Not on file     Other Topics Concern   •  Service No   • Blood Transfusions No   • Caffeine Concern No   • Occupational Exposure No   • Hobby Hazards No   • Sleep Concern No   • Stress Concern Yes   • Weight Concern Yes   • Special Diet No   • Back Care No   • Exercise Yes   • Bike Helmet No   • Seat Belt Yes   • Self-Exams Yes     Social History Narrative   • No narrative on file         EXAMINATION     Physical Exam:   Vitals: Pulse 67, temperature 36.3 °C (97.4 °F), height 1.651 m (5' 5\"), weight 87 kg (191 lb 12.8 oz), SpO2 94 %.    Constitutional:   Body Habitus: Body mass index is 31.92 kg/m².  Cooperation: Fully cooperates with exam  Appearance: Well-groomed no disheveled, in no acute distress    Respiratory-  breathing comfortable on room air, no audible wheezing  Cardiovascular- capillary refills less than 2 seconds. No lower extremity edema is noted.   Psychiatric- alert and oriented ×3. Normal affect.   Spine:  Decreased ROM of the cervical spine in all directions.  Spurling's was deferred today.  Sensation is intact to light touch in the upper extremities bilaterally.  Reflexes are 2+ biceps, triceps, patella and achilles bilaterally.  Roblero's is positive on the left and negative on the right today.  Manual muscle testing reveals no focal motor deficits in the right upper extremity and on the left 4-/5 shoulder abduction, 4/5 elbow flexion(pain in the shoulder), 5-/5 wrist extension, 5/5 elbow extension, 5/5 , and interossei. Hawkin's positive on the left.      MEDICAL DECISION MAKING    DATA    Labs: No new labs to review      Imaging: I personally reviewed following images    MRI cervical spine 05/15/2018:   There is multilevel cervical stenosis with effacement of the CSF and mild cord compression, " C4-5, C5-6 with multilevel foraminal stenosis.                 Imaging reports(films are not available at the time of the visit):   07/23/2018 Grade I anterolisthesis C4 on C5.  Mild multilevel cervical spondylolsis with no acute fracture noted.    07/23/2018 CT cervical spine without contrast: Mild multilevel spondylsosis with severe left-sided neuroforaminal narrowing noted at C4-5.  No fracture.  Degree of foraminal narrowing and canal stenosis appears less prominent on the current exam      DIAGNOSIS   Visit Diagnoses     ICD-10-CM   1. Cervical stenosis of spinal canal M48.02   2. Spondylolisthesis, cervical region M43.12   3. Foraminal stenosis of cervical region M99.81         ASSESSMENT and PLAN:     Dang Urbina 57 y.o. female with multilevel cervical spinal stenosis and foraminal stenosis.    Diagnoses and all orders for this visit:    Cervical stenosis of spinal canal  -     Buprenorphine 5 MCG/HR PATCH WEEKLY; Apply 1 Patch to skin as directed every 7 days for 28 days. Change patch every 7 days and rotate skin sites  -     CONSENT FOR OPIATE PRESCRIPTION    Spondylolisthesis, cervical region  -     Buprenorphine 5 MCG/HR PATCH WEEKLY; Apply 1 Patch to skin as directed every 7 days for 28 days. Change patch every 7 days and rotate skin sites  -     CONSENT FOR OPIATE PRESCRIPTION    Foraminal stenosis of cervical region    Discussed that chronic opioids are not safe to use with other medications that she has been taking. UDS reviewed from last visit.  She is also a high risk for opioid use based on the ORT of 16.  As a result of this, I am going to trial her on butrans patch, which should have a decreased risk of diversion and abuse risk than other options.  She is agreeable to this trial.  Discussed that the patch needs to be changed once a week, rotation locations.    Await CT from 07/23/2018 for review.  It seems to suggest that the stenosis is not as severe, but I recommend she follow-up with Dr. Tinsley.   Also, CT might not visualize soft tissue as clearly.  Continue physical therapy.     Records requested from ER visit on 07/23/2018 at [facility].    EMG will be rescheduled.    Defer shoulder management to Dr. Hernandez.    Follow up: 1 month, prn    Thank you for allowing me to participate in the care of this patient. If you have any questions please not hesitate to contact me.    Today's visit was primarily counseling and coordination of care with more than 50% of the 25 minute visit being spent in counseling and coordination of care.    Please note that this dictation was created using voice recognition software. I have made every reasonable attempt to correct obvious errors but there may be errors of grammar and content that I may have overlooked prior to finalization of this note.      Christopher Olson MD  Interventional Spine and Sports Physiatry  Physical Medicine and Rehabilitation  [facility]

## 2018-07-30 ENCOUNTER — TELEPHONE (OUTPATIENT)
Dept: BEHAVIORAL HEALTH | Facility: PHYSICIAN GROUP | Age: 58
End: 2018-07-30

## 2018-07-30 RX ORDER — TRAZODONE HYDROCHLORIDE 50 MG/1
TABLET ORAL
Qty: 90 TAB | Refills: 5 | Status: SHIPPED | OUTPATIENT
Start: 2018-07-30

## 2018-08-14 ENCOUNTER — TELEPHONE (OUTPATIENT)
Dept: PHYSICAL MEDICINE AND REHAB | Facility: MEDICAL CENTER | Age: 58
End: 2018-08-14

## 2018-08-14 NOTE — TELEPHONE ENCOUNTER
Patient called to let you know she got the patches last week and states didn't work for her, she have a mild pain and also can't stand for very long, she said needs something else to help her.

## 2018-08-22 ENCOUNTER — OFFICE VISIT (OUTPATIENT)
Dept: PHYSICAL MEDICINE AND REHAB | Facility: MEDICAL CENTER | Age: 58
End: 2018-08-22
Payer: MEDICAID

## 2018-08-22 VITALS
HEIGHT: 65 IN | HEART RATE: 78 BPM | DIASTOLIC BLOOD PRESSURE: 84 MMHG | OXYGEN SATURATION: 93 % | WEIGHT: 193.34 LBS | SYSTOLIC BLOOD PRESSURE: 128 MMHG | BODY MASS INDEX: 32.21 KG/M2 | TEMPERATURE: 97.6 F

## 2018-08-22 DIAGNOSIS — M43.12 SPONDYLOLISTHESIS, CERVICAL REGION: ICD-10-CM

## 2018-08-22 DIAGNOSIS — M48.02 FORAMINAL STENOSIS OF CERVICAL REGION: ICD-10-CM

## 2018-08-22 DIAGNOSIS — M48.02 CERVICAL STENOSIS OF SPINAL CANAL: ICD-10-CM

## 2018-08-22 DIAGNOSIS — Z79.891 OPIOID USE AGREEMENT EXISTS: ICD-10-CM

## 2018-08-22 PROCEDURE — 99214 OFFICE O/P EST MOD 30 MIN: CPT | Performed by: PHYSICAL MEDICINE & REHABILITATION

## 2018-08-22 RX ORDER — BUPRENORPHINE 10 UG/H
1 PATCH TRANSDERMAL
Qty: 4 PATCH | Refills: 0 | Status: SHIPPED | OUTPATIENT
Start: 2018-08-22 | End: 2018-09-20 | Stop reason: SDUPTHER

## 2018-08-22 ASSESSMENT — PAIN SCALES - GENERAL: PAINLEVEL: 8=MODERATE-SEVERE PAIN

## 2018-08-22 NOTE — PROGRESS NOTES
Follow up patient note  Pain Medicine, Interventional spine and sports physiatry, Physical medicine rehabilitation      Chief complaint:   Neck and left shoulder pain      HISTORY    Please see new patient note by Dr. Olson, 05/11/2018  for more details.     HPI  Patient identification: Dang Urbina 57 y.o. female who presents for follow-up of her chronic neck pain    Interval history: Dang reports that she fell while feeding her chickens and landed on her left arm, shoulder and neck.  She has had some improvement since this fall, which was in mid-July.      Physical therapy has been helpful, but is on hold.  She is concerned about the left arm as it is numb and tingling below the forearm and into the hand.  Symptoms are less on the right arm since physical therapy was started, this has continued to feel improved.  Most symptoms are on the left side.  No sensory abnormalities on the right.  No report of weakness.    From talking with her, she has not been back to Dr. Tinsley's office.  She reports that she has not seen him, but saw someone in her office.  She is not sure how she feels about neck surgery.    No side effects from butrans patch, but she is not sure if it is helping.  She does report that she has felt some constipation, but has altered her food and this has helped with having at least daily bowel movements.    Regarding her left shoulder, she has been told that she needs surgery in the past by Dr. Chiang, but that her neck is worse.       ROS Red Flags :   Fever, Chills, Sweats: Denies  Involuntary Weight Loss: Denies  Bowel/Bladder Incontinence: Denies  Saddle Anesthesia: Denies      PMHx:  Past Medical History:   Diagnosis Date   • Allergy    • Anxiety    • Arthritis    • Asthma    • COPD (chronic obstructive pulmonary disease) ([facility])    • Depression    • Diabetes ([facility])    • Hypertension    • Migraine    • Schizophrenia ([facility])        PSHx:   Past Surgical History:   Procedure Laterality Date   • TUBAL  COAGULATION LAPAROSCOPIC BILATERAL         Family history   Denies neuromuscular disease  Family History   Problem Relation Age of Onset   • Lung Disease Mother    • Hypertension Mother    • Arthritis Mother        Medications:   Outpatient Prescriptions Marked as Taking for the 8/22/18 encounter (Office Visit) with Christopher Olson M.D.   Medication Sig Dispense Refill   • Buprenorphine 10 MCG/HR PATCH WEEKLY Apply 1 Patch to skin as directed every 7 days for 28 days. 4 Patch 0   • traZODone (DESYREL) 50 MG Tab Take half tablet in am and noon, then two at bed time. 90 Tab 5   • QVAR 80 MCG/ACT inhaler   3   • clotrimazole (LOTRIMIN) 1 % Cream   1   • ONE TOUCH ULTRA TEST strip   5   • Incontinence Supply Disposable (UNDERPADS) Misc   0   • NICOTINE STEP 3 7 MG/24HR PATCH 24 HR   0   • triamcinolone acetonide (KENALOG) 0.1 % Ointment   1   • PROVENTIL  (90 Base) MCG/ACT Aero Soln inhalation aerosol   5   • PAIN & FEVER EXTRA STRENGTH 500 MG Tab   6   • gabapentin (NEURONTIN) 300 MG Cap Take 2 Caps by mouth 3 times a day. 180 Cap 0   • methocarbamol (ROBAXIN) 500 MG Tab Take 1,000 mg by mouth 4 times a day.     • montelukast (SINGULAIR) 10 MG Tab Take 10 mg by mouth every day.     • simvastatin (ZOCOR) 40 MG Tab Take 40 mg by mouth every evening.     • traZODone (DESYREL) 50 MG Tab Take 50 mg by mouth 3 times a day.     • metFORMIN (GLUCOPHAGE) 500 MG Tab Take 500 mg by mouth 2 times a day, with meals.     • fluoxetine (PROZAC) 40 MG capsule Take 40 mg by mouth every day.     • fluticasone (FLONASE) 50 MCG/ACT nasal spray Spray 1 Spray in nose every day.     • fluticasone-salmeterol (ADVAIR DISKUS) 100-50 MCG/DOSE AEROSOL POWDER, BREATH ACTIVATED Inhale 1 Puff by mouth every 12 hours.     • ipratropium-albuterol (DUONEB) 0.5-2.5 (3) MG/3ML nebulizer solution 3 mL by Nebulization route 4 times a day.     • ibuprofen (MOTRIN) 600 MG Tab Take 600 mg by mouth every 6 hours as needed.     • Albuterol Sulfate  (PROVENTIL HFA INH) Inhale  by mouth.         Current Outpatient Prescriptions   Medication   • Buprenorphine 10 MCG/HR PATCH WEEKLY   • traZODone (DESYREL) 50 MG Tab   • QVAR 80 MCG/ACT inhaler   • clotrimazole (LOTRIMIN) 1 % Cream   • ONE TOUCH ULTRA TEST strip   • Incontinence Supply Disposable (UNDERPADS) Misc   • NICOTINE STEP 3 7 MG/24HR PATCH 24 HR   • triamcinolone acetonide (KENALOG) 0.1 % Ointment   • PROVENTIL  (90 Base) MCG/ACT Aero Soln inhalation aerosol   • PAIN & FEVER EXTRA STRENGTH 500 MG Tab   • gabapentin (NEURONTIN) 300 MG Cap   • methocarbamol (ROBAXIN) 500 MG Tab   • montelukast (SINGULAIR) 10 MG Tab   • simvastatin (ZOCOR) 40 MG Tab   • traZODone (DESYREL) 50 MG Tab   • metFORMIN (GLUCOPHAGE) 500 MG Tab   • fluoxetine (PROZAC) 40 MG capsule   • fluticasone (FLONASE) 50 MCG/ACT nasal spray   • fluticasone-salmeterol (ADVAIR DISKUS) 100-50 MCG/DOSE AEROSOL POWDER, BREATH ACTIVATED   • ipratropium-albuterol (DUONEB) 0.5-2.5 (3) MG/3ML nebulizer solution   • ibuprofen (MOTRIN) 600 MG Tab   • Albuterol Sulfate (PROVENTIL HFA INH)   • OLANZapine (ZYPREXA) 5 MG Tab     No current facility-administered medications for this visit.        Allergies:   Allergies   Allergen Reactions   • Tramadol Unspecified     Patient reports that this caused falls       Social Hx:   Social History     Social History   • Marital status: Single     Spouse name: N/A   • Number of children: N/A   • Years of education: N/A     Occupational History   • disabled      2000     Social History Main Topics   • Smoking status: Current Every Day Smoker     Packs/day: 0.50     Years: 40.00     Types: Cigarettes   • Smokeless tobacco: Never Used   • Alcohol use No   • Drug use: Yes     Frequency: 2.0 times per week     Types: Marijuana      Comment: 2 A MONTH   • Sexual activity: Not on file     Other Topics Concern   •  Service No   • Blood Transfusions No   • Caffeine Concern No   • Occupational Exposure No   •  "Hobby Hazards No   • Sleep Concern No   • Stress Concern Yes   • Weight Concern Yes   • Special Diet No   • Back Care No   • Exercise Yes   • Bike Helmet No   • Seat Belt Yes   • Self-Exams Yes     Social History Narrative   • No narrative on file         EXAMINATION     Physical Exam:   Vitals: Blood pressure 128/84, pulse 78, temperature 36.4 °C (97.6 °F), height 1.651 m (5' 5\"), weight 87.7 kg (193 lb 5.5 oz), SpO2 93 %.    Constitutional:   Body Habitus: Body mass index is 32.17 kg/m².  Cooperation: Fully cooperates with exam  Appearance: Well-groomed no disheveled, in no acute distress    Respiratory-  breathing comfortable on room air, no audible wheezing  Cardiovascular- capillary refills less than 2 seconds. No lower extremity edema is noted.   Psychiatric- alert and oriented ×3. Normal affect.   Spine:  Decreased ROM of the cervical spine in all directions.   Sensation is intact to light touch in the upper extremities bilaterally.  Reflexes are 2+ biceps, triceps, patella and achilles bilaterally.  Roblero's is positive on the left and negative on the right today.  Manual muscle testing reveals no focal motor deficits in the right upper extremity; left 4-/5 shoulder abduction, 4/5 elbow flexion(pain in the shoulder), 5-/5 wrist extension, 5/5 elbow extension, 5/5 , and interossei.     Hawkin's positive on the left.      MEDICAL DECISION MAKING    DATA    Labs: No new labs to review      Imaging: I personally reviewed following images at previous visit    MRI cervical spine 05/15/2018:   There is multilevel cervical stenosis with effacement of the CSF and mild cord compression, C4-5, C5-6 with multilevel foraminal stenosis.                 Imaging reports(films are not available at the time of the visit):   07/23/2018 Grade I anterolisthesis C4 on C5.  Mild multilevel cervical spondylolsis with no acute fracture noted.    07/23/2018 CT cervical spine without contrast: Mild multilevel spondylosis with " severe left-sided neuroforaminal narrowing noted at C4-5.  No fracture.  Degree of foraminal narrowing and canal stenosis appears less prominent on the current exam      DIAGNOSIS   Visit Diagnoses     ICD-10-CM   1. Cervical stenosis of spinal canal M48.02   2. Spondylolisthesis, cervical region M43.12   3. Foraminal stenosis of cervical region M99.81         ASSESSMENT and PLAN:     Dang Urbina 57 y.o. female with multilevel cervical spinal stenosis and foraminal stenosis.    Dang was seen today for follow-up.    Diagnoses and all orders for this visit:    Cervical stenosis of spinal canal  -     Buprenorphine 10 MCG/HR PATCH WEEKLY; Apply 1 Patch to skin as directed every 7 days for 28 days.    Spondylolisthesis, cervical region  -     Buprenorphine 10 MCG/HR PATCH WEEKLY; Apply 1 Patch to skin as directed every 7 days for 28 days.    Foraminal stenosis of cervical region  -     Buprenorphine 10 MCG/HR PATCH WEEKLY; Apply 1 Patch to skin as directed every 7 days for 28 days.         Discussed that chronic opioids are not safe to use with other medications that she has been taking. UDS reviewed from last visit.  She is also a high risk for opioid use based on the ORT of 16.  We discussed increase in butrans patch, which should have a decreased risk of diversion and abuse risk than other options.  She is agreeable to this trial.  Discussed that the patch needs to be changed once a week, rotation locations.  Reviewed rotation sites currently used.    Encouraged her to follow-up with Dr. Tinsley.  Continue physical therapy that seems to be helping somewhat.  Encouraged her to work on her home exercise program.    EMG will be rescheduled.  Plan to get this scheduled for visit today    Defer shoulder management to Dr. Hernandez.    Follow up: 1 month and for EMG    Thank you for allowing me to participate in the care of this patient. If you have any questions please not hesitate to contact me.    Today's visit was  primarily counseling and coordination of care with more than 50% of the 25 minute visit being spent in counseling and coordination of care.    Please note that this dictation was created using voice recognition software. I have made every reasonable attempt to correct obvious errors but there may be errors of grammar and content that I may have overlooked prior to finalization of this note.      Christopher Olson MD  Interventional Spine and Sports Physiatry  Physical Medicine and Rehabilitation  [facility]

## 2018-08-30 DIAGNOSIS — R20.2 PARESTHESIA: ICD-10-CM

## 2018-08-30 DIAGNOSIS — M48.02 CERVICAL SPINAL STENOSIS: ICD-10-CM

## 2018-08-30 DIAGNOSIS — M48.02 FORAMINAL STENOSIS OF CERVICAL REGION: ICD-10-CM

## 2018-08-30 DIAGNOSIS — M43.12 SPONDYLOLISTHESIS OF CERVICAL REGION: ICD-10-CM

## 2018-08-31 ENCOUNTER — APPOINTMENT (OUTPATIENT)
Dept: PHYSICAL MEDICINE AND REHAB | Facility: MEDICAL CENTER | Age: 58
End: 2018-08-31
Payer: MEDICAID

## 2018-09-18 ENCOUNTER — APPOINTMENT (OUTPATIENT)
Dept: PHYSICAL MEDICINE AND REHAB | Facility: MEDICAL CENTER | Age: 58
End: 2018-09-18
Payer: MEDICAID

## 2018-09-20 ENCOUNTER — OFFICE VISIT (OUTPATIENT)
Dept: PHYSICAL MEDICINE AND REHAB | Facility: MEDICAL CENTER | Age: 58
End: 2018-09-20
Payer: MEDICAID

## 2018-09-20 VITALS
HEART RATE: 88 BPM | OXYGEN SATURATION: 91 % | WEIGHT: 203.71 LBS | TEMPERATURE: 97.6 F | SYSTOLIC BLOOD PRESSURE: 130 MMHG | BODY MASS INDEX: 33.94 KG/M2 | DIASTOLIC BLOOD PRESSURE: 70 MMHG | HEIGHT: 65 IN

## 2018-09-20 DIAGNOSIS — Z79.891 OPIOID USE AGREEMENT EXISTS: ICD-10-CM

## 2018-09-20 DIAGNOSIS — M48.02 CERVICAL STENOSIS OF SPINAL CANAL: ICD-10-CM

## 2018-09-20 DIAGNOSIS — E11.9 TYPE 2 DIABETES MELLITUS WITHOUT COMPLICATION, WITHOUT LONG-TERM CURRENT USE OF INSULIN (HCC): ICD-10-CM

## 2018-09-20 DIAGNOSIS — M43.12 SPONDYLOLISTHESIS, CERVICAL REGION: ICD-10-CM

## 2018-09-20 DIAGNOSIS — R20.2 PARESTHESIA: ICD-10-CM

## 2018-09-20 DIAGNOSIS — T40.2X5A THERAPEUTIC OPIOID INDUCED CONSTIPATION: ICD-10-CM

## 2018-09-20 DIAGNOSIS — M43.12 SPONDYLOLISTHESIS OF CERVICAL REGION: ICD-10-CM

## 2018-09-20 DIAGNOSIS — K59.03 THERAPEUTIC OPIOID INDUCED CONSTIPATION: ICD-10-CM

## 2018-09-20 DIAGNOSIS — M48.02 FORAMINAL STENOSIS OF CERVICAL REGION: ICD-10-CM

## 2018-09-20 DIAGNOSIS — M47.816 LUMBAR SPONDYLOSIS: ICD-10-CM

## 2018-09-20 DIAGNOSIS — M48.02 CERVICAL SPINAL STENOSIS: ICD-10-CM

## 2018-09-20 PROCEDURE — 99214 OFFICE O/P EST MOD 30 MIN: CPT | Performed by: PHYSICAL MEDICINE & REHABILITATION

## 2018-09-20 RX ORDER — BUPRENORPHINE 10 UG/H
1 PATCH TRANSDERMAL
Qty: 4 PATCH | Refills: 0 | Status: SHIPPED | OUTPATIENT
Start: 2018-11-22 | End: 2018-12-20

## 2018-09-20 RX ORDER — BUPRENORPHINE 10 UG/H
1 PATCH TRANSDERMAL
Qty: 4 PATCH | Refills: 0 | Status: SHIPPED | OUTPATIENT
Start: 2018-10-25 | End: 2018-11-22

## 2018-09-20 RX ORDER — BUPRENORPHINE 10 UG/H
1 PATCH TRANSDERMAL
Qty: 4 PATCH | Refills: 0 | Status: SHIPPED | OUTPATIENT
Start: 2018-09-27 | End: 2018-10-25

## 2018-09-20 RX ORDER — BISACODYL 10 MG
10 SUPPOSITORY, RECTAL RECTAL PRN
Qty: 30 SUPPOSITORY | Refills: 2 | Status: SHIPPED | OUTPATIENT
Start: 2018-09-20 | End: 2018-10-20

## 2018-09-20 RX ORDER — DOCUSATE SODIUM 100 MG/1
100 CAPSULE, LIQUID FILLED ORAL 2 TIMES DAILY
Qty: 60 CAP | Refills: 2 | Status: SHIPPED | OUTPATIENT
Start: 2018-09-20

## 2018-09-20 RX ORDER — LISINOPRIL 10 MG/1
10 TABLET ORAL DAILY
COMMUNITY

## 2018-09-20 ASSESSMENT — PAIN SCALES - GENERAL: PAINLEVEL: 10=SEVERE PAIN

## 2018-09-20 NOTE — PROGRESS NOTES
Follow up patient note  Pain Medicine, Interventional spine and sports physiatry, Physical medicine rehabilitation      Chief complaint:   Neck and left shoulder pain      HISTORY    Please see new patient note by Dr. Olson, 05/11/2018  for more details.     HPI  Patient identification: Dang Urbina 57 y.o. female who presents for follow-up of her chronic neck pain    Interval history: Dang reports that she in moving to [place].  She reports that she is going to be switching insurance and is hoping to have insurance in effect by November 2018.  She states that her physician is going to write her medications for 3 months.  She is moving next weekend and will cancel the EMG that we had planned.  She has not followed up with Dr. Tinsley regarding surgery.      She has family in [place] and she reports that she won't have to have stairs to deal with there.  It sounds like she is understanding that she will need to have neck surgery for her multilevel cervical spinal stenosis, but she is wanting to do this in [place] now.    Pain is primarily in her neck and arms and legs.  This is feeling like it is getting worse.  Her back and hip have been bothering her more.  Medications do seem to help, but she is having more sciatica on the left, lately.  She fell going up the stairs.  She saw her PCP about this.  No particular progression of weakness is noted.    It sounds like she has been very stressed and anxious. She has started taking abilify again and this is starting to help her.    Regarding bowel movements, she has been having problems with constipation.  It sounds like she was not feeling well and didn't realize that this was happening.  She eventually took a laxative and has been drinking prune juice for this now.    Regarding her left shoulder, she has been told that she needs surgery in the past by Dr. Chiang, but that her neck is worse.     ROS Red Flags :   Fever, Chills, Sweats: Reports that she has been  having chills  Involuntary Weight Loss: Denies  Bowel/Bladder Incontinence: Denies  Saddle Anesthesia: Denies      PMHx:  Past Medical History:   Diagnosis Date   • Allergy    • Anxiety    • Arthritis    • Asthma    • COPD (chronic obstructive pulmonary disease) ([facility])    • Depression    • Diabetes ([facility])    • Hypertension    • Migraine    • Schizophrenia ([facility])        PSHx:   Past Surgical History:   Procedure Laterality Date   • TUBAL COAGULATION LAPAROSCOPIC BILATERAL         Family history   Denies neuromuscular disease  Family History   Problem Relation Age of Onset   • Lung Disease Mother    • Hypertension Mother    • Arthritis Mother        Medications:   Outpatient Prescriptions Marked as Taking for the 9/20/18 encounter (Office Visit) with Christopher Olson M.D.   Medication Sig Dispense Refill   • lisinopril (PRINIVIL) 10 MG Tab Take 10 mg by mouth every day.     • [START ON 9/27/2018] Buprenorphine 10 MCG/HR PATCH WEEKLY Apply 1 Patch to skin as directed every 7 days for 28 days. 4 Patch 0   • [START ON 10/25/2018] Buprenorphine 10 MCG/HR PATCH WEEKLY Apply 1 Patch to skin as directed every 7 days for 28 days. Change every 7 days.  Rotate sites 4 Patch 0   • [START ON 11/22/2018] Buprenorphine 10 MCG/HR PATCH WEEKLY Apply 1 Patch to skin as directed every 7 days for 28 days. Change every 7 days.  Rotate sites 4 Patch 0   • docusate sodium (COLACE) 100 MG Cap Take 1 Cap by mouth 2 times a day. 60 Cap 2   • bisacodyl (DULCOLAX) 10 MG Suppos Insert 1 Suppository in rectum as needed for up to 30 days. 30 Suppository 2   • traZODone (DESYREL) 50 MG Tab Take half tablet in am and noon, then two at bed time. 90 Tab 5   • QVAR 80 MCG/ACT inhaler   3   • clotrimazole (LOTRIMIN) 1 % Cream   1   • ONE TOUCH ULTRA TEST strip   5   • Incontinence Supply Disposable (UNDERPADS) Misc   0   • NICOTINE STEP 3 7 MG/24HR PATCH 24 HR   0   • triamcinolone acetonide (KENALOG) 0.1 % Ointment   1   • PROVENTIL  (90 Base)  MCG/ACT Aero Soln inhalation aerosol   5   • gabapentin (NEURONTIN) 300 MG Cap Take 2 Caps by mouth 3 times a day. 180 Cap 0   • methocarbamol (ROBAXIN) 500 MG Tab Take 1,000 mg by mouth 4 times a day.     • montelukast (SINGULAIR) 10 MG Tab Take 10 mg by mouth every day.     • simvastatin (ZOCOR) 40 MG Tab Take 40 mg by mouth every evening.     • traZODone (DESYREL) 50 MG Tab Take 50 mg by mouth 3 times a day.     • metFORMIN (GLUCOPHAGE) 500 MG Tab Take 500 mg by mouth 2 times a day, with meals.     • fluoxetine (PROZAC) 40 MG capsule Take 40 mg by mouth every day.     • fluticasone (FLONASE) 50 MCG/ACT nasal spray Spray 1 Spray in nose every day.     • fluticasone-salmeterol (ADVAIR DISKUS) 100-50 MCG/DOSE AEROSOL POWDER, BREATH ACTIVATED Inhale 1 Puff by mouth every 12 hours.     • ipratropium-albuterol (DUONEB) 0.5-2.5 (3) MG/3ML nebulizer solution 3 mL by Nebulization route 4 times a day.     • Albuterol Sulfate (PROVENTIL HFA INH) Inhale  by mouth.         Current Outpatient Prescriptions   Medication   • lisinopril (PRINIVIL) 10 MG Tab   • [START ON 9/27/2018] Buprenorphine 10 MCG/HR PATCH WEEKLY   • [START ON 10/25/2018] Buprenorphine 10 MCG/HR PATCH WEEKLY   • [START ON 11/22/2018] Buprenorphine 10 MCG/HR PATCH WEEKLY   • docusate sodium (COLACE) 100 MG Cap   • bisacodyl (DULCOLAX) 10 MG Suppos   • traZODone (DESYREL) 50 MG Tab   • QVAR 80 MCG/ACT inhaler   • clotrimazole (LOTRIMIN) 1 % Cream   • ONE TOUCH ULTRA TEST strip   • Incontinence Supply Disposable (UNDERPADS) Misc   • NICOTINE STEP 3 7 MG/24HR PATCH 24 HR   • triamcinolone acetonide (KENALOG) 0.1 % Ointment   • PROVENTIL  (90 Base) MCG/ACT Aero Soln inhalation aerosol   • gabapentin (NEURONTIN) 300 MG Cap   • methocarbamol (ROBAXIN) 500 MG Tab   • montelukast (SINGULAIR) 10 MG Tab   • simvastatin (ZOCOR) 40 MG Tab   • traZODone (DESYREL) 50 MG Tab   • metFORMIN (GLUCOPHAGE) 500 MG Tab   • fluoxetine (PROZAC) 40 MG capsule   • fluticasone  "(FLONASE) 50 MCG/ACT nasal spray   • fluticasone-salmeterol (ADVAIR DISKUS) 100-50 MCG/DOSE AEROSOL POWDER, BREATH ACTIVATED   • ipratropium-albuterol (DUONEB) 0.5-2.5 (3) MG/3ML nebulizer solution   • Albuterol Sulfate (PROVENTIL HFA INH)   • PAIN & FEVER EXTRA STRENGTH 500 MG Tab   • OLANZapine (ZYPREXA) 5 MG Tab   • ibuprofen (MOTRIN) 600 MG Tab     No current facility-administered medications for this visit.        Allergies:   Allergies   Allergen Reactions   • Tramadol Unspecified     Patient reports that this caused falls       Social Hx:   Social History     Social History   • Marital status: Single     Spouse name: N/A   • Number of children: N/A   • Years of education: N/A     Occupational History   • disabled      2000     Social History Main Topics   • Smoking status: Current Every Day Smoker     Packs/day: 0.50     Years: 40.00     Types: Cigarettes   • Smokeless tobacco: Never Used   • Alcohol use No   • Drug use: Yes     Frequency: 2.0 times per week     Types: Marijuana      Comment: 2 A MONTH   • Sexual activity: Not on file     Other Topics Concern   •  Service No   • Blood Transfusions No   • Caffeine Concern No   • Occupational Exposure No   • Hobby Hazards No   • Sleep Concern No   • Stress Concern Yes   • Weight Concern Yes   • Special Diet No   • Back Care No   • Exercise Yes   • Bike Helmet No   • Seat Belt Yes   • Self-Exams Yes     Social History Narrative   • No narrative on file         EXAMINATION     Physical Exam:   Vitals: Blood pressure 130/70, pulse 88, temperature 36.4 °C (97.6 °F), height 1.651 m (5' 5\"), weight 92.4 kg (203 lb 11.3 oz), SpO2 91 %.    Constitutional:   Body Habitus: Body mass index is 33.9 kg/m².  Cooperation: Fully cooperates with exam  Appearance: Well-groomed no disheveled, in no acute distress    Respiratory-  breathing comfortable on room air, no audible wheezing  Cardiovascular- capillary refills less than 2 seconds. No lower extremity edema is " noted.   Psychiatric- alert and oriented ×3. Normal affect.   Spine:  Decreased ROM of the cervical spine in all directions.   Sensation is intact to light touch in the upper extremities bilaterally.  Reflexes are 2+ biceps, triceps, patella and achilles bilaterally.  Roblero's is positive on the left and negative on the right today.  Manual muscle testing reveals no focal motor deficits in the right upper extremity; left 4-/5 shoulder abduction, 4/5 elbow flexion(pain in the shoulder), 5-/5 wrist extension, 5/5 elbow extension, 5/5 , and interossei.   No focal motor deficits in the lower extremities bilaterally.  Hyperesthesias in the lower extremities bilaterally.  Positive seated SLR on the left and negative on the right.      MEDICAL DECISION MAKING    DATA    Labs: No new labs to review      Imaging: I personally reviewed following images at previous visit    MRI cervical spine 05/15/2018:   There is multilevel cervical stenosis with effacement of the CSF and mild cord compression, C4-5, C5-6 with multilevel foraminal stenosis.                 Imaging reports(films are not available at the time of the visit):   07/23/2018 Grade I anterolisthesis C4 on C5.  Mild multilevel cervical spondylolsis with no acute fracture noted.    07/23/2018 CT cervical spine without contrast: Mild multilevel spondylosis with severe left-sided neuroforaminal narrowing noted at C4-5.  No fracture.  Degree of foraminal narrowing and canal stenosis appears less prominent on the current exam      DIAGNOSIS   Visit Diagnoses     ICD-10-CM   1. Cervical spinal stenosis M48.02   2. Spondylolisthesis of cervical region M43.12   3. Foraminal stenosis of cervical region M99.81   4. Paresthesia R20.2   5. Opioid use agreement exists Z02.89   6. Lumbar spondylosis M47.816   7. Type 2 diabetes mellitus without complication, without long-term current use of insulin (HCC) E11.9   8. Cervical stenosis of spinal canal M48.02   9.  Spondylolisthesis, cervical region M43.12   10. Therapeutic opioid induced constipation K59.03    [id number]         ASSESSMENT and PLAN:     Dang Urbina 57 y.o. female with multilevel cervical spinal stenosis and foraminal stenosis.    Dang was seen today for follow-up.    Diagnoses and all orders for this visit:    Cervical spinal stenosis    Spondylolisthesis of cervical region    Foraminal stenosis of cervical region  -     Buprenorphine 10 MCG/HR PATCH WEEKLY; Apply 1 Patch to skin as directed every 7 days for 28 days.  -     Buprenorphine 10 MCG/HR PATCH WEEKLY; Apply 1 Patch to skin as directed every 7 days for 28 days. Change every 7 days.  Rotate sites  -     Buprenorphine 10 MCG/HR PATCH WEEKLY; Apply 1 Patch to skin as directed every 7 days for 28 days. Change every 7 days.  Rotate sites    Paresthesia    Opioid use agreement exists    Lumbar spondylosis    Type 2 diabetes mellitus without complication, without long-term current use of insulin ([facility])    Cervical stenosis of spinal canal  -     Buprenorphine 10 MCG/HR PATCH WEEKLY; Apply 1 Patch to skin as directed every 7 days for 28 days.  -     Buprenorphine 10 MCG/HR PATCH WEEKLY; Apply 1 Patch to skin as directed every 7 days for 28 days. Change every 7 days.  Rotate sites  -     Buprenorphine 10 MCG/HR PATCH WEEKLY; Apply 1 Patch to skin as directed every 7 days for 28 days. Change every 7 days.  Rotate sites    Spondylolisthesis, cervical region  -     Buprenorphine 10 MCG/HR PATCH WEEKLY; Apply 1 Patch to skin as directed every 7 days for 28 days.  -     Buprenorphine 10 MCG/HR PATCH WEEKLY; Apply 1 Patch to skin as directed every 7 days for 28 days. Change every 7 days.  Rotate sites  -     Buprenorphine 10 MCG/HR PATCH WEEKLY; Apply 1 Patch to skin as directed every 7 days for 28 days. Change every 7 days.  Rotate sites    Therapeutic opioid induced constipation  -     docusate sodium (COLACE) 100 MG Cap; Take 1 Cap by mouth 2 times a day.  -      bisacodyl (DULCOLAX) 10 MG Suppos; Insert 1 Suppository in rectum as needed for up to 30 days.         Discussed that chronic opioids are not safe to use with other medications that she has been taking. UDS reviewed from last visit.  She is also a high risk for opioid use based on the ORT of 16.   reviewed, she has been getting medications only from us.  Discussed that the patch needs to be changed once a week, rotation locations.  Reviewed rotation sites currently used.   She does feel like this has helped with pain.    Discussed again that constipation is an issue with medications that are used for pain.  Scripts given for colace and discussed using bisacodyl prn.    She will follow-up with her PCP regarding general medical complaints again this week.   She is moving to [place].  I am writing scripts for her for the next few months.    Continue physical therapy that seems to be helping somewhat.  She may be able to have a few more sessions before she moves.  Encouraged her to work on her home exercise program.    She plans on trying to get a doctor in [place] once she has insurance there.  She reports that she has images on CD of her cervical spine and will plan to have referral made for a spine surgeon there.     Follow up: N/A moving to [place]    Thank you for allowing me to participate in the care of this patient. If you have any questions please not hesitate to contact me.    Today's visit was primarily counseling and coordination of care with more than 50% of the 25 minute visit being spent in counseling and coordination of care.    Please note that this dictation was created using voice recognition software. I have made every reasonable attempt to correct obvious errors but there may be errors of grammar and content that I may have overlooked prior to finalization of this note.      Christopher Olson MD  Interventional Spine and Sports Physiatry  Physical Medicine and Rehabilitation  [facility]

## 2018-09-27 ENCOUNTER — APPOINTMENT (OUTPATIENT)
Dept: PHYSICAL MEDICINE AND REHAB | Facility: MEDICAL CENTER | Age: 58
End: 2018-09-27
Payer: MEDICAID